# Patient Record
Sex: FEMALE | Race: WHITE
[De-identification: names, ages, dates, MRNs, and addresses within clinical notes are randomized per-mention and may not be internally consistent; named-entity substitution may affect disease eponyms.]

---

## 2018-02-14 ENCOUNTER — HOSPITAL ENCOUNTER (OUTPATIENT)
Age: 63
End: 2018-02-14
Payer: COMMERCIAL

## 2018-02-14 DIAGNOSIS — M79.606: Primary | ICD-10-CM

## 2018-02-14 PROCEDURE — 76882 US LMTD JT/FCL EVL NVASC XTR: CPT

## 2018-02-20 ENCOUNTER — HOSPITAL ENCOUNTER (OUTPATIENT)
Age: 63
End: 2018-02-20
Payer: COMMERCIAL

## 2018-02-20 DIAGNOSIS — M79.605: Primary | ICD-10-CM

## 2018-02-20 PROCEDURE — 73590 X-RAY EXAM OF LOWER LEG: CPT

## 2018-04-16 ENCOUNTER — HOSPITAL ENCOUNTER (OUTPATIENT)
Age: 63
End: 2018-04-16
Payer: COMMERCIAL

## 2018-04-16 DIAGNOSIS — Z12.31: Primary | ICD-10-CM

## 2018-04-16 PROCEDURE — 77067 SCR MAMMO BI INCL CAD: CPT

## 2018-06-20 ENCOUNTER — HOSPITAL ENCOUNTER (OUTPATIENT)
Age: 63
End: 2018-06-20
Payer: COMMERCIAL

## 2018-06-20 DIAGNOSIS — Z13.820: Primary | ICD-10-CM

## 2018-06-20 DIAGNOSIS — Z78.0: ICD-10-CM

## 2018-06-20 PROCEDURE — 77080 DXA BONE DENSITY AXIAL: CPT

## 2019-05-08 ENCOUNTER — HOSPITAL ENCOUNTER (OUTPATIENT)
Age: 64
End: 2019-05-08
Payer: COMMERCIAL

## 2019-05-08 DIAGNOSIS — Z12.31: Primary | ICD-10-CM

## 2019-05-08 PROCEDURE — 77067 SCR MAMMO BI INCL CAD: CPT

## 2019-05-21 ENCOUNTER — HOSPITAL ENCOUNTER (OUTPATIENT)
Age: 64
End: 2019-05-21
Payer: COMMERCIAL

## 2019-05-21 DIAGNOSIS — R92.8: Primary | ICD-10-CM

## 2019-05-21 PROCEDURE — 77065 DX MAMMO INCL CAD UNI: CPT

## 2019-05-21 PROCEDURE — 76641 ULTRASOUND BREAST COMPLETE: CPT

## 2021-04-06 ENCOUNTER — HOSPITAL ENCOUNTER (OUTPATIENT)
Age: 66
End: 2021-04-06
Payer: COMMERCIAL

## 2021-04-06 DIAGNOSIS — M85.89: Primary | ICD-10-CM

## 2021-04-06 PROCEDURE — 77080 DXA BONE DENSITY AXIAL: CPT

## 2022-12-21 NOTE — PROGRESS NOTES
Chief complaint/Reason for consult: Thyroid nodule    Consult requested by Daja Aguirre DO    HPI: Patient is a 67year old female here for evaluation of a thyroid nodule.     # Thyroid nodule:   - First noted 8/2022 after patient noted abnormal lymph node and a CT neck was done showing thyroid nodules and several small normal-appearing lymph nodes  - Her brother has lymphoma so she is always concerned about any lymph node swelling  - Last thyroid ultrasound: 9/15/2022 showing heterogenous thyroid gland with multiple nodules; a 1.2 x 0.6 x 1.2 cm nodule at the right aspect of the isthmus, a 0.9 x 0.7 x 1.1 cm nodule in the mid right lobe (TR5), a 0.5 cm nodule in the isthmus and a 1.1 x 0.4 x 0.6 cm nodule in the left lower lobe  - Prior FNA's: None  - Denies dysphagia, dysphonia, dyspnea  - Denies history of radiation to the head/neck  - No known family history of thyroid cancer  - No tobacco use    - 4/21/22 TSH 3.35 and FT4 0.97     Review of Systems   Constitutional: Negative for activity change, fatigue and unexpected weight change.   HENT: Negative for trouble swallowing and voice change.    Eyes: Negative for visual disturbance.   Respiratory: Negative for shortness of breath.    Cardiovascular: Negative for chest pain.   Gastrointestinal: Negative for abdominal pain.   Endocrine: Negative for cold intolerance and heat intolerance.   Musculoskeletal: Positive for arthralgias (foot pain) and gait problem.   Skin: Negative for rash.   Neurological: Negative for headaches.   Psychiatric/Behavioral: Negative for agitation and confusion.        Physical Exam  Vitals reviewed.   Constitutional:       General: She is not in acute distress.     Appearance: Normal appearance.   HENT:      Head: Normocephalic.   Eyes:      Conjunctiva/sclera: Conjunctivae normal.   Cardiovascular:      Rate and Rhythm: Normal rate.   Pulmonary:      Effort: Pulmonary effort is normal.   Abdominal:      Tenderness: There is no  guarding.   Musculoskeletal:      Cervical back: Neck supple.   Skin:     General: Skin is warm and dry.   Neurological:      Mental Status: She is alert and oriented to person, place, and time.   Psychiatric:         Mood and Affect: Mood normal.         Behavior: Behavior normal.         Thought Content: Thought content normal.        Neck Ultrasound Report    Indication: Thyroid nodule    Comparison Imaging: no, report only    Real time high resolution imaging of the thyroid gland was performed in transverse and longitudinal planes.        The right thyroid lobe measured 3.63 cm length x 1.52 cm AP x 1.63 cm in TV dimension. The isthmus measured 2.5 mm in thickness. The left thyroid lobe measured 3.63 cm length x 1.23 cm AP x 1.06 cm in TV dimension.     Lobes: Multiple small scattered cysts, spongiform nodules and nodules with the largest nodules detailed below     Nodule #1:   1. 0.82 cm x 0.59 cm x 0.68 cm located in the right mid thyroid lobe  2. Spongiform  3. No calcifications, multiple areas of linear colloid  4. Well-defined margins/borders without halo  5. Grade 3 vascularity    Nodule #2:  1.   1.27 cm x 0.53 cm x 1.13 cm on the right aspect of isthmus  2.   Isoechoic with hypoechoic halo  3.   No calcification seen  4.   Well-defined borders with altered thyroid borders without extrathyroidal extension and a hypoechoic halo  5.   Grade 1 vascularity     No pathologic lymph nodes were seen.  Several normal-appearing lymph nodes were seen in bilateral jugulodigastric chain    Impression: Multiple thyroid nodules and cysts.  The largest 2 nodules are in the right mid thyroid lobe and right aspect of the isthmus.  The right thyroid nodule has a spongiform appearance and is subcentimeter so does not meet criteria for FNA at this time.  The isthmus nodule is isoechoic with length greater than with and 1.27 cm in maximum dimension without other concerning features.  FNA was offered to the patient today but  deferred in favor of serial imaging.    Images saved to PACS.     Ultrasound Guided Thyroid Biopsy not done today    Assessment and plan:     Diagnoses and all orders for this visit:    1. Multiple thyroid nodules (Primary)  Assessment & Plan:  -Patient with multiple thyroid nodules and cysts the majority of them are subcentimeter however there are 2 nodules greater than 1 cm described above  -The right thyroid lobe nodule appears to be spongiform and subcentimeter with hyperechoic portions representative of colloid and not calcifications, FNA not indicated at this time recommend repeat ultrasound in 6 months to 1 year  -The isthmus nodule is intermediate suspicion, FNA offered today versus serial ultrasound and patient prefers to follow with ultrasound in 6 months to see if any significant changes and if there are concerning findings at that time get FNA done  -Recommend patient return in 6 months for follow-up ultrasound or sooner with change in physical exam or new onset compressive symptoms    Orders:  -     US Thyroid; Future       Return in about 6 months (around 6/22/2023) for thyroid nodule with ultrasound .     I spent 30 minutes caring for Keya on this date of service. This time includes time spent by me in the following activities: preparing for the visit, reviewing tests, performing a medically appropriate examination and/or evaluation, counseling and educating the patient/family/caregiver, ordering medications, tests, or procedures and documenting information in the medical record I spent 25 minutes on the separately reported service of thyroid ultrasound and interpretation. This time is not included in the time used to support the E/M service also reported today.       Electronically signed by Kyle S Rosenstein, MD

## 2022-12-22 ENCOUNTER — OFFICE VISIT (OUTPATIENT)
Dept: ENDOCRINOLOGY | Facility: CLINIC | Age: 67
End: 2022-12-22

## 2022-12-22 VITALS
SYSTOLIC BLOOD PRESSURE: 120 MMHG | OXYGEN SATURATION: 97 % | HEIGHT: 64 IN | DIASTOLIC BLOOD PRESSURE: 64 MMHG | BODY MASS INDEX: 25.95 KG/M2 | WEIGHT: 152 LBS | HEART RATE: 75 BPM

## 2022-12-22 DIAGNOSIS — E04.2 MULTIPLE THYROID NODULES: Primary | ICD-10-CM

## 2022-12-22 PROCEDURE — 76536 US EXAM OF HEAD AND NECK: CPT | Performed by: HOSPITALIST

## 2022-12-22 PROCEDURE — 99203 OFFICE O/P NEW LOW 30 MIN: CPT | Performed by: HOSPITALIST

## 2022-12-22 RX ORDER — SPIRONOLACTONE 25 MG/1
25 TABLET ORAL DAILY
COMMUNITY
Start: 2022-12-11

## 2022-12-22 RX ORDER — METFORMIN HYDROCHLORIDE 500 MG/1
1000 TABLET, EXTENDED RELEASE ORAL 2 TIMES DAILY
COMMUNITY
Start: 2022-11-29

## 2022-12-22 RX ORDER — RALOXIFENE HYDROCHLORIDE 60 MG/1
60 TABLET, FILM COATED ORAL DAILY
COMMUNITY
Start: 2022-11-09

## 2022-12-22 RX ORDER — LISINOPRIL 2.5 MG/1
2.5 TABLET ORAL DAILY
COMMUNITY
Start: 2022-12-11

## 2022-12-22 NOTE — ASSESSMENT & PLAN NOTE
-Patient with multiple thyroid nodules and cysts the majority of them are subcentimeter however there are 2 nodules greater than 1 cm described above  -The right thyroid lobe nodule appears to be spongiform and subcentimeter with hyperechoic portions representative of colloid and not calcifications, FNA not indicated at this time recommend repeat ultrasound in 6 months to 1 year  -The isthmus nodule is intermediate suspicion, FNA offered today versus serial ultrasound and patient prefers to follow with ultrasound in 6 months to see if any significant changes and if there are concerning findings at that time get FNA done  -Recommend patient return in 6 months for follow-up ultrasound or sooner with change in physical exam or new onset compressive symptoms

## 2024-07-03 NOTE — PROGRESS NOTES
Chief complaint/Reason for consult: Thyroid nodule    HPI:Patient is a 68 year old female here for follow-up of thyroid nodules.  She was last seen on 6/29/2023 and reports since that time no significant changes in her health.     # Thyroid nodules:   - First noted 8/2022 after patient noted abnormal lymph node and a CT neck was done showing thyroid nodules and several small normal-appearing lymph nodes  - Her brother has lymphoma so she is always concerned about any lymph node swelling  - Last thyroid ultrasound: 6/29/2023 with a 0.79 cm right lower lobe nodule, 1.40 cm right isthmus nodule and small cysts on the left lobe  - Prior FNA's: 6/30/2023 of right isthmus nodule consistent with benign adenoma  - Denies dysphagia, dysphonia, dyspnea  - Denies history of radiation to the head/neck  - No known family history of thyroid cancer  - No tobacco use    - 4/21/22 TSH 3.35 and FT4 0.97     Review of Systems   Constitutional:  Negative for activity change and unexpected weight change.   HENT:  Negative for trouble swallowing and voice change.    Eyes:  Negative for visual disturbance.   Respiratory:  Negative for shortness of breath.    Cardiovascular:  Negative for chest pain.   Musculoskeletal:  Negative for gait problem.   Psychiatric/Behavioral:  Negative for agitation and confusion.         Physical Exam  Vitals reviewed.   Constitutional:       General: She is not in acute distress.  HENT:      Head: Normocephalic.      Nose: Nose normal.   Eyes:      Conjunctiva/sclera: Conjunctivae normal.   Cardiovascular:      Rate and Rhythm: Normal rate.      Pulses: Normal pulses.   Pulmonary:      Effort: Pulmonary effort is normal.   Abdominal:      Tenderness: There is no guarding.   Musculoskeletal:         General: No deformity.   Skin:     General: Skin is warm and dry.   Neurological:      Mental Status: She is alert and oriented to person, place, and time.   Psychiatric:         Mood and Affect: Mood normal.          Behavior: Behavior normal.        Neck Ultrasound Report    Indication: Thyroid nodule    Comparison Imaging: yes     Real time high resolution imaging of the thyroid gland was performed in transverse and longitudinal planes.        The right thyroid lobe measured 3.78 cm length x 1.59 cm AP x 1.18 cm in TV dimension. The isthmus measured 2.5 mm in thickness. The left thyroid lobe measured 3.51 cm length x 1.15 cm AP x 1.14 cm in TV dimension.     Nodule #1:   Right lower lobe L 0.70 x AP 0.58 x TV 0.69 cm  Spongiform  Linear colloid with small punctate echogenic foci  Well-defined margins without halo  Grade 3 vascularity     Nodule #2   Right isthmus, L 1.31 x AP 0.44 x TV 1.06 cm  Mixed cystic/solid  No calcification seen  Well-defined margins without halo  Grade 1 vascularity    There are additional subcentimeter spongiform nodules and cysts in the left lower lobe that do not meet criteria for biopsy or follow-up    There is a borderline left jugulodigastric chain 0.98 cm oval lymph node present.     Impression: Stable appearing thyroid nodules with borderline left jugulodigastric chain lymph node    Images saved to PACS.     Assessment and plan:     Diagnoses and all orders for this visit:    1. Multiple thyroid nodules (Primary)  Assessment & Plan:  -Previously biopsied right isthmus thyroid nodule is slightly smaller in size but more hypoechoic today; previously negative FNA  -Due to change in characteristic and borderline lymph node present we will get a repeat thyroid ultrasound in 4 months and if changes at that time will consider FNA  -Right lower lobe nodule is stable  -Multiple subcentimeter spongiform and cysts in the left thyroid lobe that do not meet criteria for follow-up    Orders:  -     US Thyroid; Future       Return in about 4 months (around 11/5/2024) for Thyroid nodule.     Electronically signed by Kyle S Rosenstein, MD

## 2024-07-05 ENCOUNTER — OFFICE VISIT (OUTPATIENT)
Dept: ENDOCRINOLOGY | Facility: CLINIC | Age: 69
End: 2024-07-05
Payer: MEDICARE

## 2024-07-05 VITALS
HEIGHT: 64 IN | DIASTOLIC BLOOD PRESSURE: 74 MMHG | WEIGHT: 154.2 LBS | SYSTOLIC BLOOD PRESSURE: 128 MMHG | HEART RATE: 74 BPM | BODY MASS INDEX: 26.32 KG/M2 | OXYGEN SATURATION: 97 %

## 2024-07-05 DIAGNOSIS — E04.2 MULTIPLE THYROID NODULES: Primary | ICD-10-CM

## 2024-07-05 NOTE — ASSESSMENT & PLAN NOTE
-Previously biopsied right isthmus thyroid nodule is slightly smaller in size but more hypoechoic today; previously negative FNA  -Due to change in characteristic and borderline lymph node present we will get a repeat thyroid ultrasound in 4 months and if changes at that time will consider FNA  -Right lower lobe nodule is stable  -Multiple subcentimeter spongiform and cysts in the left thyroid lobe that do not meet criteria for follow-up

## 2024-11-07 NOTE — PROGRESS NOTES
Chief complaint/Reason for consult: Multiple thyroid nodules    HPI: Patient is a 69 year old female here for follow-up of thyroid nodules.  She was last seen on 7/5/2024 and reports since that time no significant changes in health. Has some bothersome allergies.      # Thyroid nodules:   - First noted 8/2022 after patient noted abnormal lymph node and a CT neck was done showing thyroid nodules and several small normal-appearing lymph nodes  - Her brother has lymphoma so she is always concerned about any lymph node swelling  - Last thyroid ultrasound: 7/5/2024 with a 0.70 cm right lower lobe nodule, 1.31 cm right isthmus nodule and small cysts on the left lobe  - Prior FNA's: 6/30/2023 of right isthmus nodule consistent with benign adenoma  - Denies dysphagia, dysphonia, dyspnea  - Denies history of radiation to the head/neck  - No known family history of thyroid cancer  - No tobacco use    - 4/21/22 TSH 3.35 and FT4 0.97     Review of Systems   Constitutional:  Negative for activity change and unexpected weight change.   HENT:  Positive for rhinorrhea and sinus pressure. Negative for trouble swallowing and voice change.    Eyes:  Negative for visual disturbance.   Respiratory:  Negative for shortness of breath.    Cardiovascular:  Negative for chest pain.   Gastrointestinal:  Negative for abdominal pain.   Endocrine: Negative for cold intolerance and heat intolerance.   Musculoskeletal:  Negative for gait problem.   Skin:  Negative for rash.   Psychiatric/Behavioral:  Negative for agitation.         Physical Exam  Vitals reviewed.   Constitutional:       General: She is not in acute distress.  HENT:      Head: Normocephalic.      Nose: Nose normal.   Eyes:      Conjunctiva/sclera: Conjunctivae normal.   Cardiovascular:      Rate and Rhythm: Normal rate.      Pulses: Normal pulses.   Pulmonary:      Effort: Pulmonary effort is normal.   Abdominal:      Tenderness: There is no guarding.   Musculoskeletal:          General: No deformity.      Cervical back: Neck supple.   Skin:     General: Skin is warm and dry.   Neurological:      Mental Status: She is alert and oriented to person, place, and time.   Psychiatric:         Mood and Affect: Mood normal.          Neck Ultrasound Report    Indication: Thyroid nodule    Comparison Imaging: yes     Real time high resolution imaging of the thyroid gland was performed in transverse and longitudinal planes.        The right thyroid lobe measured 3.89 cm length x 1.15 cm AP x 1.69 cm in TV dimension. The isthmus measured 5.2 mm in thickness. The left thyroid lobe measured 3.95 cm length x 0.94 cm AP x 1.38 cm in TV dimension.      Nodule #1:   Right lower lobe; L 0.72cm x TV 0.60cm x AP 0.52cm  Spongiform with punctate hyperechoic foci  Well-defined margins without halo    Nodule #2:   Right sided isthmus; L 1.30cm x TV 1.14cm x AP 0.52cm   Isoechoic with hypoechoic halo  Abutting the thyroid capsule  No calcification seen  Well-defined margins    Additional spongiform nodules and cysts in the left lower lobe, all subcentimeter     Multiple lymph nodes seen bilaterally with the largest measuring 0.98 cm in the left jugulodigastric region and 0.76 cm in the right jugulodigastric region (level III and IV); they all have fairly normal architecture    Impression: Isthmus nodule that meets criteria for biopsy based on changing ultrasound characteristics    Images saved to PACS.     Ultrasound Guided Thyroid Biopsy    Comparison Report: No    Indication:  Thyroid nodule    After informed consent, the neck was prepped in sterile fashion. Real time high resolution ultrasound imaging demonstrated a    With ultrasound guidance of needle localization, 4 aspirates were obtained with sterile 27g. needles. Sample was placed in [employer] and sample was also collected for [facility] genetic testing if needed.    The patient tolerated the procedure without difficulty or complications.      Assessment and  plan:     Diagnoses and all orders for this visit:    1. Multiple thyroid nodules (Primary)  Assessment & Plan:  -Right isthmus nodule with prior negative FNA, has changing characteristics on ultrasound today, recommend repeat FNA  -Right lower lobe nodule is stable in size and characteristics  -Left subcentimeter nodules do not meet criteria for FNA at this time  -FNA performed today, additional follow-up pending results    Orders:  -     US Thyroid; Future  -     NON-GYN CYTOLOGY, [facility] ([facility])         Return in about 1 year (around 11/8/2025) for Thyroid nodule.     Electronically signed by Kyle S Rosenstein, MD   Addendum   FNA 11/8/2024 consistent with benign follicular nodule  Discussed result on phone with patient  Will get repeat US in 1 year or sooner with change in exam or compressive symptoms

## 2024-11-08 ENCOUNTER — OFFICE VISIT (OUTPATIENT)
Dept: ENDOCRINOLOGY | Facility: CLINIC | Age: 69
End: 2024-11-08
Payer: MEDICARE

## 2024-11-08 VITALS
WEIGHT: 150.6 LBS | HEART RATE: 76 BPM | OXYGEN SATURATION: 98 % | DIASTOLIC BLOOD PRESSURE: 78 MMHG | BODY MASS INDEX: 25.71 KG/M2 | HEIGHT: 64 IN | SYSTOLIC BLOOD PRESSURE: 126 MMHG

## 2024-11-08 DIAGNOSIS — E04.2 MULTIPLE THYROID NODULES: Primary | ICD-10-CM

## 2024-11-08 RX ORDER — EZETIMIBE 10 MG/1
10 TABLET ORAL DAILY
COMMUNITY
Start: 2024-11-07

## 2024-11-08 RX ORDER — LISINOPRIL 10 MG/1
10 TABLET ORAL DAILY
COMMUNITY
Start: 2024-11-07

## 2024-11-08 NOTE — ASSESSMENT & PLAN NOTE
-Right isthmus nodule with prior negative FNA, has changing characteristics on ultrasound today, recommend repeat FNA  -Right lower lobe nodule is stable in size and characteristics  -Left subcentimeter nodules do not meet criteria for FNA at this time  -FNA performed today, additional follow-up pending results

## 2024-11-11 LAB — REF LAB TEST METHOD: NORMAL
